# Patient Record
Sex: FEMALE | Race: WHITE | Employment: OTHER | URBAN - NONMETROPOLITAN AREA
[De-identification: names, ages, dates, MRNs, and addresses within clinical notes are randomized per-mention and may not be internally consistent; named-entity substitution may affect disease eponyms.]

---

## 2019-04-24 LAB
ABSOLUTE BASO #: 0 10*3/UL (ref 0–0.1)
ABSOLUTE EOS #: 0.2 10*3/UL (ref 0–0.4)
ABSOLUTE NEUT #: 3.8 10*3/UL (ref 2.3–7.9)
ALBUMIN: 3.5 GM/DL (ref 3.1–4.5)
ALP BLD-CCNC: 117 U/L (ref 45–117)
ALT SERPL-CCNC: 32 U/L (ref 12–78)
AST SERPL-CCNC: 19 IU/L (ref 3–35)
BASOPHILS %: 0.6 % (ref 0–1)
BILIRUB SERPL-MCNC: 0.5 MG/DL (ref 0.2–1)
BILIRUBIN DIRECT: <0.1 MG/DL (ref 0–0.2)
BUN BLDV-MCNC: 16 MG/DL (ref 7–24)
CALCIUM SERPL-MCNC: 8.7 MG/DL (ref 8.5–10.5)
CHLORIDE BLD-SCNC: 108 MMOL/L (ref 98–107)
CHOLESTEROL: 184 MG/DL
CO2: 27 MMOL/L (ref 21–32)
CREAT SERPL-MCNC: 0.79 MG/DL (ref 0.55–1.02)
EOSINOPHILS %: 3.3 % (ref 1–4)
ESTIMATED AVERAGE GLUCOSE: 120
GFR AFRICAN AMERICAN: > 60 ML/MIN
GFR SERPL CREATININE-BSD FRML MDRD: >60 ML/MIN/
GLUCOSE: 109 MG/DL (ref 65–99)
HBA1C MFR BLD: 5.8 % (ref 4.8–5.6)
HCT VFR BLD CALC: 44.6 % (ref 37–47)
HDLC SERPL-MCNC: 49 MG/DL (ref 40–60)
HEMOGLOBIN: 14.7 G/DL (ref 12–16)
IMMATURE GRANULOCYTES #: 0 10*3/UL (ref 0–0.1)
IMMATURE GRANULOCYTES: 0.3 % (ref 0–1)
LDL CHOLESTEROL: 109 MG/DL (ref 9–159)
LYMPHOCYTE %: 31.5 % (ref 27–41)
LYMPHOCYTES # BLD: 2.1 10*3/UL (ref 1.3–4.4)
MCH RBC QN AUTO: 27.1 PG (ref 27–31)
MCHC RBC AUTO-ENTMCNC: 33 G/DL (ref 33–37)
MCV RBC AUTO: 82.1 FL (ref 81–99)
MONOCYTES # BLD: 0.5 10*3/UL (ref 0.1–1)
MONOCYTES %: 6.8 % (ref 3–9)
NEUTROPHILS %: 57.5 % (ref 47–73)
NUCLEATED RED BLOOD CELLS: 0 % (ref 0–0)
PDW BLD-RTO: 14.3 % (ref 0–14.5)
PHOSPHORUS: 3.7 MG/DL (ref 2.5–4.9)
PLATELET # BLD: 228 10*3/UL (ref 130–400)
PMV BLD AUTO: 11.1 FL (ref 9.6–12.3)
POTASSIUM SERPL-SCNC: 3.9 MMOL/L (ref 3.5–5.1)
RBC # BLD: 5.43 10*6/UL (ref 4.1–5.1)
SODIUM BLD-SCNC: 141 MMOL/L (ref 136–145)
TOTAL PROTEIN: 7 GM/DL (ref 6.4–8.2)
TRIGL SERPL-MCNC: 131 MG/DL
VLDLC SERPL CALC-MCNC: 26 MG/DL (ref 6–40)
WBC # BLD: 6.6 10*3/UL (ref 4.8–10.8)

## 2019-09-05 ENCOUNTER — OFFICE VISIT (OUTPATIENT)
Dept: FAMILY MEDICINE CLINIC | Age: 69
End: 2019-09-05
Payer: MEDICARE

## 2019-09-05 ENCOUNTER — HOSPITAL ENCOUNTER (OUTPATIENT)
Age: 69
Discharge: HOME | End: 2019-09-07
Payer: MEDICARE

## 2019-09-05 VITALS
BODY MASS INDEX: 31.61 KG/M2 | WEIGHT: 161 LBS | TEMPERATURE: 97 F | HEART RATE: 90 BPM | SYSTOLIC BLOOD PRESSURE: 142 MMHG | DIASTOLIC BLOOD PRESSURE: 76 MMHG | OXYGEN SATURATION: 98 % | HEIGHT: 60 IN

## 2019-09-05 DIAGNOSIS — R51.9 NONINTRACTABLE EPISODIC HEADACHE, UNSPECIFIED HEADACHE TYPE: Primary | ICD-10-CM

## 2019-09-05 DIAGNOSIS — F32.A DEPRESSION, UNSPECIFIED DEPRESSION TYPE: ICD-10-CM

## 2019-09-05 DIAGNOSIS — R51.9 NONINTRACTABLE EPISODIC HEADACHE, UNSPECIFIED HEADACHE TYPE: ICD-10-CM

## 2019-09-05 DIAGNOSIS — R25.1 TREMORS OF NERVOUS SYSTEM: ICD-10-CM

## 2019-09-05 LAB
ALBUMIN SERPL-MCNC: 4.2 G/DL (ref 3.5–5.2)
ANION GAP SERPL CALCULATED.3IONS-SCNC: 16 MMOL/L (ref 7–16)
BASOPHILS ABSOLUTE: 0.05 E9/L (ref 0–0.2)
BASOPHILS RELATIVE PERCENT: 0.4 % (ref 0–2)
BUN BLDV-MCNC: 16 MG/DL (ref 8–23)
CALCIUM SERPL-MCNC: 9.6 MG/DL (ref 8.6–10.2)
CHLORIDE BLD-SCNC: 102 MMOL/L (ref 98–107)
CO2: 23 MMOL/L (ref 22–29)
CREAT SERPL-MCNC: 0.8 MG/DL (ref 0.5–1)
EOSINOPHILS ABSOLUTE: 0.23 E9/L (ref 0.05–0.5)
EOSINOPHILS RELATIVE PERCENT: 2 % (ref 0–6)
FOLATE: 16.2 NG/ML (ref 4.8–24.2)
GFR AFRICAN AMERICAN: >60
GFR NON-AFRICAN AMERICAN: >60 ML/MIN/1.73
GLUCOSE BLD-MCNC: 87 MG/DL (ref 74–99)
HCT VFR BLD CALC: 45.6 % (ref 34–48)
HEMOGLOBIN: 14.3 G/DL (ref 11.5–15.5)
IMMATURE GRANULOCYTES #: 0.04 E9/L
IMMATURE GRANULOCYTES %: 0.4 % (ref 0–5)
LYMPHOCYTES ABSOLUTE: 3.08 E9/L (ref 1.5–4)
LYMPHOCYTES RELATIVE PERCENT: 27.3 % (ref 20–42)
MCH RBC QN AUTO: 25.9 PG (ref 26–35)
MCHC RBC AUTO-ENTMCNC: 31.4 % (ref 32–34.5)
MCV RBC AUTO: 82.6 FL (ref 80–99.9)
MONOCYTES ABSOLUTE: 0.65 E9/L (ref 0.1–0.95)
MONOCYTES RELATIVE PERCENT: 5.8 % (ref 2–12)
NEUTROPHILS ABSOLUTE: 7.24 E9/L (ref 1.8–7.3)
NEUTROPHILS RELATIVE PERCENT: 64.1 % (ref 43–80)
PDW BLD-RTO: 14.3 FL (ref 11.5–15)
PHOSPHORUS: 3.6 MG/DL (ref 2.5–4.5)
PLATELET # BLD: 246 E9/L (ref 130–450)
PMV BLD AUTO: 11.9 FL (ref 7–12)
POTASSIUM SERPL-SCNC: 3.7 MMOL/L (ref 3.5–5)
RBC # BLD: 5.52 E12/L (ref 3.5–5.5)
SEDIMENTATION RATE, ERYTHROCYTE: 10 MM/HR (ref 0–20)
SODIUM BLD-SCNC: 141 MMOL/L (ref 132–146)
TSH SERPL DL<=0.05 MIU/L-ACNC: 1.82 UIU/ML (ref 0.27–4.2)
VITAMIN B-12: 437 PG/ML (ref 211–946)
WBC # BLD: 11.3 E9/L (ref 4.5–11.5)

## 2019-09-05 PROCEDURE — 84443 ASSAY THYROID STIM HORMONE: CPT

## 2019-09-05 PROCEDURE — 82746 ASSAY OF FOLIC ACID SERUM: CPT

## 2019-09-05 PROCEDURE — 36415 COLL VENOUS BLD VENIPUNCTURE: CPT

## 2019-09-05 PROCEDURE — 80069 RENAL FUNCTION PANEL: CPT

## 2019-09-05 PROCEDURE — 85651 RBC SED RATE NONAUTOMATED: CPT

## 2019-09-05 PROCEDURE — 82607 VITAMIN B-12: CPT

## 2019-09-05 PROCEDURE — 99214 OFFICE O/P EST MOD 30 MIN: CPT | Performed by: INTERNAL MEDICINE

## 2019-09-05 PROCEDURE — 85025 COMPLETE CBC W/AUTO DIFF WBC: CPT

## 2019-09-05 RX ORDER — PAROXETINE 10 MG/1
TABLET, FILM COATED ORAL
Refills: 1 | COMMUNITY
Start: 2019-07-27 | End: 2020-04-16 | Stop reason: SDUPTHER

## 2019-09-05 NOTE — PROGRESS NOTES
tone. Coordination normal.   Skin: Skin is warm and dry. Psychiatric: She has a normal mood and affect. Her behavior is normal.       ASSESSMENT/PLAN:    ICD-10-CM    1. Nonintractable episodic headache, unspecified headache type R51 SEDIMENTATION RATE     Renal Panel     CBC Auto Differential     TSH   2. Depression, unspecified depression type F32.9 TSH     VITAMIN B12 & FOLATE   3. Tremors of nervous system R25.1 Renal Panel     VITAMIN B12 & FOLATE      This is a new onset of a headache that is intermittent. Sharp stabbing pain in the same area of the head each time. Laboratories being performed today and this is all within normal parameters and the next step would be to see neurology. She still has tremor that occasionally occurs. Additional laboratory to be performed for evaluation of this. No follow-ups on file. An electronic signature was used to authenticate this note.     --Corky Mcginnis, DO on 9/5/2019 at 4:36 PM

## 2019-09-20 RX ORDER — ACETAMINOPHEN 160 MG
TABLET,DISINTEGRATING ORAL
COMMUNITY
Start: 2011-01-17

## 2019-09-20 RX ORDER — PYRIDOXINE HCL (VITAMIN B6) 100 MG
TABLET ORAL
COMMUNITY
Start: 2011-08-04

## 2020-01-30 ENCOUNTER — TELEPHONE (OUTPATIENT)
Dept: FAMILY MEDICINE CLINIC | Age: 70
End: 2020-01-30

## 2020-04-17 RX ORDER — PAROXETINE 10 MG/1
TABLET, FILM COATED ORAL
Qty: 30 TABLET | Refills: 1 | Status: SHIPPED
Start: 2020-04-17 | End: 2020-06-11 | Stop reason: SDUPTHER

## 2020-06-11 ENCOUNTER — VIRTUAL VISIT (OUTPATIENT)
Dept: FAMILY MEDICINE CLINIC | Age: 70
End: 2020-06-11
Payer: MEDICARE

## 2020-06-11 PROCEDURE — G8417 CALC BMI ABV UP PARAM F/U: HCPCS | Performed by: INTERNAL MEDICINE

## 2020-06-11 PROCEDURE — G8400 PT W/DXA NO RESULTS DOC: HCPCS | Performed by: INTERNAL MEDICINE

## 2020-06-11 PROCEDURE — 99212 OFFICE O/P EST SF 10 MIN: CPT | Performed by: INTERNAL MEDICINE

## 2020-06-11 PROCEDURE — 1090F PRES/ABSN URINE INCON ASSESS: CPT | Performed by: INTERNAL MEDICINE

## 2020-06-11 PROCEDURE — 4040F PNEUMOC VAC/ADMIN/RCVD: CPT | Performed by: INTERNAL MEDICINE

## 2020-06-11 PROCEDURE — 1036F TOBACCO NON-USER: CPT | Performed by: INTERNAL MEDICINE

## 2020-06-11 PROCEDURE — G8427 DOCREV CUR MEDS BY ELIG CLIN: HCPCS | Performed by: INTERNAL MEDICINE

## 2020-06-11 PROCEDURE — 3017F COLORECTAL CA SCREEN DOC REV: CPT | Performed by: INTERNAL MEDICINE

## 2020-06-11 PROCEDURE — 1123F ACP DISCUSS/DSCN MKR DOCD: CPT | Performed by: INTERNAL MEDICINE

## 2020-06-11 RX ORDER — PAROXETINE 10 MG/1
TABLET, FILM COATED ORAL
Qty: 30 TABLET | Refills: 5 | Status: SHIPPED | OUTPATIENT
Start: 2020-06-11

## 2020-06-11 ASSESSMENT — PATIENT HEALTH QUESTIONNAIRE - PHQ9
SUM OF ALL RESPONSES TO PHQ9 QUESTIONS 1 & 2: 0
2. FEELING DOWN, DEPRESSED OR HOPELESS: 0
SUM OF ALL RESPONSES TO PHQ QUESTIONS 1-9: 0
1. LITTLE INTEREST OR PLEASURE IN DOING THINGS: 0
SUM OF ALL RESPONSES TO PHQ QUESTIONS 1-9: 0

## 2020-06-11 NOTE — PROGRESS NOTES
Patient Is here for check up and refills. Here sugar couple days ago was 102. TeleMedicine Video Visit    This visit was performed as a virtual video visit using a synchronous, two-way, audio-video telehealth technology platform. Patient identification was verified at the start of the visit, including the patient's telephone number and physical location. I discussed with the patient the nature of our telehealth visits, that:     1. Due to the nature of an audio- video modality, the only components of a physical exam that could be done are the elements supported by direct observation. 2. I would evaluate the patient and recommend diagnostics and treatments based on my assessment. 3. If it was felt that the patient should be evaluated in clinic or an emergency room setting, then they would be directed there. 4. Our sessions are not being recorded and that personal health information is protected. 5. Our team would provide follow up care in person if/when the patient needs it. Patient does agree to proceed with telemedicine consultation. Patient's location: home address in [facility]  Physician  location other address in [facility] :[place], [place]       2020     Juani Crump (:  1950) is a 81 y.o. female, seen for virtual visit today for medication refills. She says she has been healthy otherwise and does not have any new complaints. Has not had any bladder issues since last seen. She is been exercising and on a more stringent diet since her  is a diabetic. She is lost 8 pounds over the last few months. She denies any chest pain or shortness of breath. No nausea vomiting or diarrhea. Chief Complaint   Patient presents with    Medication Refill         Review of Systems    Prior to Visit Medications    Medication Sig Taking?  Authorizing Provider   PARoxetine (PAXIL) 10 MG tablet TAKE 1 TABLET BY MOUTH EVERY DAY Yes Alanna Coombs, DO   Calcium 500-125 MG-UNIT TABS Take by mouth Yes Historical Provider, MD   Ascorbic Acid (VITAMIN C) 500 MG CHEW Take by mouth Yes Historical Provider, MD   Cholecalciferol (VITAMIN D3) 2000 units CAPS Take by mouth Yes Historical Provider, MD   Pediatric Multivitamins-Fl (MULTI VIT/FL PO) Take by mouth Yes Historical Provider, MD      Allergies   Allergen Reactions    Levofloxacin     Macadamia Nut Oil     Primidone      Other reaction(s): hallucinations       No past medical history on file. No past surgical history on file. Social History     Tobacco Use    Smoking status: Never Smoker    Smokeless tobacco: Never Used   Substance Use Topics    Alcohol use: Not on file        There were no vitals filed for this visit. Estimated body mass index is 31.44 kg/m² as calculated from the following:    Height as of 9/5/19: 5' (1.524 m). Weight as of 9/5/19: 161 lb (73 kg). Physical Exam  Neurological:      Mental Status: She is alert. Very limited physical exam due to a video exam being performed. She did not appear in any acute distress. Was able to ambulate in the room without any difficulty. ASSESSMENT/PLAN:  Tasha Kern was seen today for medication refill. Diagnoses and all orders for this visit:    Benign essential tremor    Anxiety    Other orders  -     PARoxetine (PAXIL) 10 MG tablet; TAKE 1 TABLET BY MOUTH EVERY DAY         Since she still having tremors and her vitamin B was low normal I recommended a B complex vitamin daily. We will recheck her vitamin B12 levels at the next laboratory draw in 5 to 6 months. Refill of her medications given today. She is not having any other issues otherwise and I will see her back in 6 months. An electronic signature was used to authenticate this note. --Shalonda Dempsey DO on 6/11/2020 at 11:20 AM      Time spent: Greater than Not billed by time    This visit was completed virtually using [employer] me

## 2025-01-21 ENCOUNTER — HOSPITAL ENCOUNTER (OUTPATIENT)
Age: 75
Discharge: HOME | End: 2025-01-23

## 2025-01-21 LAB
ABO + RH BLD: NORMAL
ARM BAND NUMBER: NORMAL
BLOOD BANK SAMPLE EXPIRATION: NORMAL
BLOOD GROUP ANTIBODIES SERPL: NEGATIVE

## 2025-01-21 PROCEDURE — 86850 RBC ANTIBODY SCREEN: CPT

## 2025-01-21 PROCEDURE — 87081 CULTURE SCREEN ONLY: CPT

## 2025-01-21 PROCEDURE — 86900 BLOOD TYPING SEROLOGIC ABO: CPT

## 2025-01-21 PROCEDURE — 86901 BLOOD TYPING SEROLOGIC RH(D): CPT

## 2025-01-23 LAB
MICROORGANISM SPEC CULT: NORMAL
SPECIMEN DESCRIPTION: NORMAL

## 2025-01-29 ENCOUNTER — HOSPITAL ENCOUNTER (OUTPATIENT)
Age: 75
Discharge: HOME | End: 2025-01-31

## 2025-01-29 LAB
ANION GAP SERPL CALCULATED.3IONS-SCNC: 15 MMOL/L (ref 7–16)
BUN SERPL-MCNC: 12 MG/DL (ref 6–23)
CALCIUM SERPL-MCNC: 9.2 MG/DL (ref 8.6–10.2)
CHLORIDE SERPL-SCNC: 105 MMOL/L (ref 98–107)
CO2 SERPL-SCNC: 20 MMOL/L (ref 22–29)
CREAT SERPL-MCNC: 0.7 MG/DL (ref 0.5–1)
ERYTHROCYTE [DISTWIDTH] IN BLOOD BY AUTOMATED COUNT: 13.9 % (ref 11.5–15)
GFR, ESTIMATED: >90 ML/MIN/1.73M2
GLUCOSE SERPL-MCNC: 159 MG/DL (ref 74–99)
HCT VFR BLD AUTO: 42.1 % (ref 34–48)
HGB BLD-MCNC: 13.2 G/DL (ref 11.5–15.5)
MCH RBC QN AUTO: 25.9 PG (ref 26–35)
MCHC RBC AUTO-ENTMCNC: 31.4 G/DL (ref 32–34.5)
MCV RBC AUTO: 82.5 FL (ref 80–99.9)
PLATELET # BLD AUTO: 217 K/UL (ref 130–450)
PMV BLD AUTO: 11.9 FL (ref 7–12)
POTASSIUM SERPL-SCNC: 4.1 MMOL/L (ref 3.5–5)
RBC # BLD AUTO: 5.1 M/UL (ref 3.5–5.5)
SODIUM SERPL-SCNC: 140 MMOL/L (ref 132–146)
WBC OTHER # BLD: 17.4 K/UL (ref 4.5–11.5)

## 2025-01-29 PROCEDURE — 80048 BASIC METABOLIC PNL TOTAL CA: CPT

## 2025-01-29 PROCEDURE — 85027 COMPLETE CBC AUTOMATED: CPT
